# Patient Record
Sex: FEMALE | NOT HISPANIC OR LATINO | URBAN - METROPOLITAN AREA
[De-identification: names, ages, dates, MRNs, and addresses within clinical notes are randomized per-mention and may not be internally consistent; named-entity substitution may affect disease eponyms.]

---

## 2017-02-10 VITALS
HEART RATE: 89 BPM | OXYGEN SATURATION: 98 % | DIASTOLIC BLOOD PRESSURE: 70 MMHG | WEIGHT: 205.03 LBS | HEIGHT: 63 IN | TEMPERATURE: 98 F | SYSTOLIC BLOOD PRESSURE: 156 MMHG | RESPIRATION RATE: 16 BRPM

## 2017-02-13 ENCOUNTER — INPATIENT (INPATIENT)
Facility: HOSPITAL | Age: 42
LOS: 3 days | Discharge: HOME | DRG: 352 | End: 2017-02-17
Attending: SURGERY | Admitting: SURGERY
Payer: COMMERCIAL

## 2017-02-13 DIAGNOSIS — Z90.49 ACQUIRED ABSENCE OF OTHER SPECIFIED PARTS OF DIGESTIVE TRACT: Chronic | ICD-10-CM

## 2017-02-13 DIAGNOSIS — Z98.891 HISTORY OF UTERINE SCAR FROM PREVIOUS SURGERY: Chronic | ICD-10-CM

## 2017-02-13 RX ORDER — SODIUM CHLORIDE 9 MG/ML
1000 INJECTION, SOLUTION INTRAVENOUS
Qty: 0 | Refills: 0 | Status: DISCONTINUED | OUTPATIENT
Start: 2017-02-13 | End: 2017-02-14

## 2017-02-13 RX ORDER — CEFAZOLIN SODIUM 1 G
2000 VIAL (EA) INJECTION EVERY 8 HOURS
Qty: 0 | Refills: 0 | Status: DISCONTINUED | OUTPATIENT
Start: 2017-02-13 | End: 2017-02-17

## 2017-02-13 RX ORDER — HEPARIN SODIUM 5000 [USP'U]/ML
5000 INJECTION INTRAVENOUS; SUBCUTANEOUS EVERY 8 HOURS
Qty: 0 | Refills: 0 | Status: DISCONTINUED | OUTPATIENT
Start: 2017-02-14 | End: 2017-02-17

## 2017-02-13 RX ORDER — ONDANSETRON 8 MG/1
4 TABLET, FILM COATED ORAL EVERY 6 HOURS
Qty: 0 | Refills: 0 | Status: DISCONTINUED | OUTPATIENT
Start: 2017-02-13 | End: 2017-02-15

## 2017-02-13 RX ORDER — BUPIVACAINE HCL/PF 7.5 MG/ML
400 VIAL (ML) INJECTION
Qty: 0 | Refills: 0 | Status: DISCONTINUED | OUTPATIENT
Start: 2017-02-13 | End: 2017-02-16

## 2017-02-13 RX ORDER — HYDROMORPHONE HYDROCHLORIDE 2 MG/ML
30 INJECTION INTRAMUSCULAR; INTRAVENOUS; SUBCUTANEOUS
Qty: 0 | Refills: 0 | Status: DISCONTINUED | OUTPATIENT
Start: 2017-02-13 | End: 2017-02-15

## 2017-02-13 RX ORDER — NALOXONE HYDROCHLORIDE 4 MG/.1ML
0.1 SPRAY NASAL
Qty: 0 | Refills: 0 | Status: DISCONTINUED | OUTPATIENT
Start: 2017-02-13 | End: 2017-02-15

## 2017-02-13 RX ADMIN — HYDROMORPHONE HYDROCHLORIDE 30 MILLILITER(S): 2 INJECTION INTRAMUSCULAR; INTRAVENOUS; SUBCUTANEOUS at 17:49

## 2017-02-13 RX ADMIN — ONDANSETRON 4 MILLIGRAM(S): 8 TABLET, FILM COATED ORAL at 20:26

## 2017-02-13 RX ADMIN — Medication 100 MILLIGRAM(S): at 19:26

## 2017-02-13 NOTE — PROGRESS NOTE ADULT - SUBJECTIVE AND OBJECTIVE BOX
Pre Op Dx: Ventral hernia  Procedure: Cari procedure   Surgeon: Dr. Smith    S: Pt seen and examined in PACU, has no complaints at this time. Denies CP, SOB, N/V. Pain controlled with medication.    O:  T(C): 36.4, Max: 36.8 (02-13 @ 17:03)  T(F): 97.6, Max: 98.3 (02-13 @ 17:03)  HR: 73 (72 - 97)  BP: 146/91 (120/75 - 146/91)  RR: 16 (16 - 16)  SpO2: 100% (98% - 100%)  Wt(kg): --            Gen: NAD, resting comfortably in bed  Neuro: AAOx3, No focal deficits  C/V: RRR, +S1/S2  Pulm: CTAB, no respiratory distress  Abd: soft, ND, ATTP  Incision: C/D/I  Drains:    Extrem: WNL, SCDs in place      A/P: 41yFemale s/p above procedure  Diet: NPO  IVF  Pain/nausea control PRN   DVT ppx: SCDs/SQH  Zak  JOANNA x 2

## 2017-02-13 NOTE — H&P ADULT. - ASSESSMENT
41F s/p [place] repair of incisional hernia, right inguinal hernia repair, creation of myofascial flaps, scar removal and complex wound closure with insertion of On-Q pumps  - admit  - npo until bowel function  - ivf  - pain control; dPCA, on-q pumps  - nausea control  - HSQ in am  - ancef until drains come out  - semi-fowlers  - OOB tomorrow hunched over  - JOANNA's to bulb suction  - abdominal binder  - IS/SCDs

## 2017-02-13 NOTE — BRIEF OPERATIVE NOTE - OPERATION/FINDINGS
peritoneum entered. Progrip mesh placed retrorectus in lower abdomen. Complex abdominal wound closure with placement of on-q pumps and #10 JOANNA's under subq flaps. peritoneum entered. Progrip mesh placed retrorectus in lower abdomen. Complex abdominal wound closure with placement of on-q pumps and #10 JOANNA's under subq flaps. Complex wound closure in multiple layers was performed.

## 2017-02-13 NOTE — BRIEF OPERATIVE NOTE - PROCEDURE
Excision of mass  02/13/2017  left abdomen  Active  [id number]  Exploratory laparotomy  02/13/2017    Active  [id number]  Repair, hernia, inguinal, open  02/13/2017  right  Active  [id number]  Incisional hernia repair with mesh  02/13/2017    Active  [id number]  Layer closure of wound  02/13/2017  abdomen  Active  [id number]

## 2017-02-13 NOTE — BRIEF OPERATIVE NOTE - POST-OP DX
Abdominal wall mass  02/13/2017    Eliza Morales  Incisional hernia, without obstruction or gangrene  02/13/2017    Eliza Morales  Inguinal hernia, right  02/13/2017    Eliza Morales

## 2017-02-13 NOTE — H&P ADULT. - HISTORY OF PRESENT ILLNESS
This is a 41 year old female who presents for elective abdominal wall reconstruction with repair of incisional and right inguinal hernia. She has a history of 3 c-sections. She denies any obstructive symptoms, but has pain at the hernia sites as well as visible bulges and discomfort.

## 2017-02-13 NOTE — BRIEF OPERATIVE NOTE - PRE-OP DX
Abdominal wall mass  02/13/2017    Eliza Morales  Incisional hernia, without obstruction or gangrene  02/13/2017    Eliza Morales  Inguinal hernia of right side without obstruction or gangrene  02/13/2017    Eliza Morales

## 2017-02-14 LAB
ANION GAP SERPL CALC-SCNC: 4 MMOL/L — LOW (ref 9–16)
BUN SERPL-MCNC: 7 MG/DL — SIGNIFICANT CHANGE UP (ref 7–23)
CALCIUM SERPL-MCNC: 8.2 MG/DL — LOW (ref 8.5–10.5)
CHLORIDE SERPL-SCNC: 105 MMOL/L — SIGNIFICANT CHANGE UP (ref 96–108)
CO2 SERPL-SCNC: 32 MMOL/L — HIGH (ref 22–31)
CREAT SERPL-MCNC: 0.79 MG/DL — SIGNIFICANT CHANGE UP (ref 0.5–1.3)
GLUCOSE SERPL-MCNC: 94 MG/DL — SIGNIFICANT CHANGE UP (ref 70–99)
HCT VFR BLD CALC: 36.8 % — SIGNIFICANT CHANGE UP (ref 34.5–45)
HGB BLD-MCNC: 12.2 G/DL — SIGNIFICANT CHANGE UP (ref 11.5–15.5)
MAGNESIUM SERPL-MCNC: 2.2 MG/DL — SIGNIFICANT CHANGE UP (ref 1.6–2.4)
MCHC RBC-ENTMCNC: 26.2 PG — LOW (ref 27–34)
MCHC RBC-ENTMCNC: 33.2 G/DL — SIGNIFICANT CHANGE UP (ref 32–36)
MCV RBC AUTO: 79 FL — LOW (ref 80–100)
PHOSPHATE SERPL-MCNC: 2.3 MG/DL — LOW (ref 2.5–4.5)
PLATELET # BLD AUTO: 261 K/UL — SIGNIFICANT CHANGE UP (ref 150–400)
POTASSIUM SERPL-MCNC: 3.6 MMOL/L — SIGNIFICANT CHANGE UP (ref 3.5–5.3)
POTASSIUM SERPL-SCNC: 3.6 MMOL/L — SIGNIFICANT CHANGE UP (ref 3.5–5.3)
RBC # BLD: 4.66 M/UL — SIGNIFICANT CHANGE UP (ref 3.8–5.2)
RBC # FLD: 13.2 % — SIGNIFICANT CHANGE UP (ref 10.3–16.9)
SODIUM SERPL-SCNC: 141 MMOL/L — SIGNIFICANT CHANGE UP (ref 135–145)
WBC # BLD: 8.5 K/UL — SIGNIFICANT CHANGE UP (ref 3.8–10.5)
WBC # FLD AUTO: 8.5 K/UL — SIGNIFICANT CHANGE UP (ref 3.8–10.5)

## 2017-02-14 RX ORDER — SODIUM CHLORIDE 9 MG/ML
1000 INJECTION, SOLUTION INTRAVENOUS
Qty: 0 | Refills: 0 | Status: DISCONTINUED | OUTPATIENT
Start: 2017-02-14 | End: 2017-02-15

## 2017-02-14 RX ORDER — POTASSIUM PHOSPHATE, MONOBASIC POTASSIUM PHOSPHATE, DIBASIC 236; 224 MG/ML; MG/ML
15 INJECTION, SOLUTION INTRAVENOUS ONCE
Qty: 0 | Refills: 0 | Status: COMPLETED | OUTPATIENT
Start: 2017-02-14 | End: 2017-02-14

## 2017-02-14 RX ADMIN — SODIUM CHLORIDE 125 MILLILITER(S): 9 INJECTION, SOLUTION INTRAVENOUS at 02:35

## 2017-02-14 RX ADMIN — ONDANSETRON 4 MILLIGRAM(S): 8 TABLET, FILM COATED ORAL at 21:31

## 2017-02-14 RX ADMIN — HEPARIN SODIUM 5000 UNIT(S): 5000 INJECTION INTRAVENOUS; SUBCUTANEOUS at 16:43

## 2017-02-14 RX ADMIN — Medication 100 MILLIGRAM(S): at 11:57

## 2017-02-14 RX ADMIN — Medication 100 MILLIGRAM(S): at 20:27

## 2017-02-14 RX ADMIN — HEPARIN SODIUM 5000 UNIT(S): 5000 INJECTION INTRAVENOUS; SUBCUTANEOUS at 23:55

## 2017-02-14 RX ADMIN — POTASSIUM PHOSPHATE, MONOBASIC POTASSIUM PHOSPHATE, DIBASIC 63.75 MILLIMOLE(S): 236; 224 INJECTION, SOLUTION INTRAVENOUS at 16:43

## 2017-02-14 RX ADMIN — HEPARIN SODIUM 5000 UNIT(S): 5000 INJECTION INTRAVENOUS; SUBCUTANEOUS at 06:30

## 2017-02-14 RX ADMIN — Medication 100 MILLIGRAM(S): at 04:20

## 2017-02-14 NOTE — PROGRESS NOTE ADULT - SUBJECTIVE AND OBJECTIVE BOX
INTERVAL HPI/OVERNIGHT EVENTS:   SURGERY ATTENDING    STATUS POST:                           EXPLORATORY LAPAROTOMY, REPAIR OF INCISIONAL HERNIA WITH MYOFASCIAL FLAPS BILATERALLY AND REPAIR OF RIGHT INGUINAL HERNIA PREPERITONEALLY WITH PERITONEAL FLAP AND MESH, EXCISION OF ABDOMINAL WALL MASS ON THE LEFT, EXCISION OF SCAR TISSUE 10X2CM WITH EXCESSIVE SKIN AND FAT, ON Q PUMP PLACEMENT FOR PAIN CONTROL, PREVENA VAC PLACEMENT AS WOUND MANAGEMENT SYSTEM, DRAINAGE, COMPLEX ABDOMINAL WOUND CLOSURE BY PLASTICS, ABDOMINAL WALL RECONSTRUCTION    POST OPERATIVE DAY #: 1    SUBJECTIVE:  Flatus: [ ] YES [X ] NO             Bowel Movement: [ ] YES [X ] NO  Pain (0-10):     2       Pain Control Adequate: [ ] YES [ ] NO  Nausea: [ ] YES [X ] NO            Vomiting: [ ] YES [X ] NO  Diarrhea: [ ] YES [X ] NO         Constipation: [ ] YES [X ] NO     Chest Pain: [ ] YES [X ] NO    SOB:  [ ] YES [X ] NO    MEDICATIONS  (STANDING):  BUpivacaine 0.5% On-Q Pump 400milliLiter(s) IntraDermal. <Continuous>  heparin  Injectable 5000Unit(s) SubCutaneous every 8 hours  lactated ringers. 1000milliLiter(s) IV Continuous <Continuous>  HYDROmorphone PCA (1 mG/mL) 30milliLiter(s) PCA Continuous PCA Continuous  ceFAZolin   IVPB 2000milliGRAM(s) IV Intermittent every 8 hours    MEDICATIONS  (PRN):  naloxone Injectable 0.1milliGRAM(s) IV Push every 3 minutes PRN For ANY of the following changes in patient status:  A. RR LESS THAN 10 breaths per minute, B. Oxygen saturation LESS THAN 90%, C. Sedation score of 6  ondansetron Injectable 4milliGRAM(s) IV Push every 6 hours PRN Nausea      Vital Signs Last 24 Hrs  T(C): 36.2, Max: 36.8 (02-13 @ 17:03)  T(F): 97.1, Max: 98.3 (02-13 @ 17:03)  HR: 68 (67 - 97)  BP: 131/70 (120/75 - 146/91)  BP(mean): --  RR: 14 (14 - 16)  SpO2: 99% (98% - 100%)            I&O's Detail    I & Os for current day (as of 14 Feb 2017 13:49)  =============================================  IN:    Other: 2800 ml    lactated ringers.: 1125 ml    Solution: 50 ml    Total IN: 3975 ml  ---------------------------------------------  OUT:    Indwelling Catheter - Urethral: 925 ml    Estimated Blood Loss: 200 ml    Bulb: 81 ml    Bulb: 78 ml    Total OUT: 1284 ml  ---------------------------------------------  Total NET: 2691 ml      LABS:                RADIOLOGY & ADDITIONAL STUDIES:

## 2017-02-14 NOTE — PROGRESS NOTE ADULT - ASSESSMENT
41yFemale s/p above procedure    NPO  IVF  PAin/nausea control  DVT ppx  IS  AM labs 41yFemale s/p cecilia procedure for incisional hernia repair. Pt has passed flatus, will discuss advancing to CLD.    NPO  IVF  PAin/nausea control  DVT ppx  IS  AM labs

## 2017-02-14 NOTE — PROGRESS NOTE ADULT - ASSESSMENT
A/P: 40 y/o female s/p incisional hernia repair, right inguinal hernia repair with mesh, and bilateral component separation.   - Patient to be up and out of bed as much as possible walking.   - Diet / dispo as per primary team.

## 2017-02-14 NOTE — PROGRESS NOTE ADULT - ASSESSMENT
ABD SOFT, BS-, FLATUS-, BM-, PREVENA IN PLACE, JOANNA'S SEROSANGUINOUS, ON Q PUMP IN PLACE.  START ICE CHIPS AND WATER, IF TOLERATING ADVANCE TO CLEARS, DVT PROFILAXIS, OOB TO CHAIR, SPIROMETRY, F/U LABS, VS.

## 2017-02-14 NOTE — PROGRESS NOTE ADULT - SUBJECTIVE AND OBJECTIVE BOX
O/N: POC WNL. Afebrile, VSS  2/13: Underwent cecilia repair of incisional hernia.     STATUS POST: cecilia repair of incisional hernia    POST OP DAY #: 1 STATUS POST: cecilia repair of incisional hernia    POST OP DAY #: 1    STATUS POST:       SUBJECTIVE: Pt has passed flatus. Patient seen and examined bedside by chief resident.    O/N: POC WNL. Afebrile, VSS  2/13: Underwent cecilia repair of incisional hernia.       heparin  Injectable 5000Unit(s) SubCutaneous every 8 hours  ceFAZolin   IVPB 2000milliGRAM(s) IV Intermittent every 8 hours      Vital Signs Last 24 Hrs  T(C): 36.6, Max: 36.8 (02-13 @ 17:03)  T(F): 97.8, Max: 98.3 (02-13 @ 17:03)  HR: 67 (67 - 97)  BP: 132/72 (120/75 - 146/91)  BP(mean): --  RR: 14 (14 - 16)  SpO2: 99% (98% - 100%)  I&O's Detail    I & Os for current day (as of s/p cecilia repair of incision al14 Feb 2017 07:17)  =============================================  IN:    Other: 2800 ml    lactated ringers.: 1125 ml    Solution: 50 ml    Total IN: 3975 ml  ---------------------------------------------  OUT:    Indwelling Catheter - Urethral: 925 ml    Estimated Blood Loss: 200 ml    Bulb: 81 ml    Bulb: 78 ml    Total OUT: 1284 ml  ---------------------------------------------  Total NET: 2691 ml      General: NAD, resting comfortably in bed  C/V: NSR  Pulm: Nonlabored breathing, no respiratory distress  Abd: soft, NT/ND, site w/o discharge/edema/erythema  Extrem: WWP, no edema, SCDs in place        LABS:                RADIOLOGY & ADDITIONAL STUDIES:

## 2017-02-14 NOTE — PROGRESS NOTE ADULT - SUBJECTIVE AND OBJECTIVE BOX
SUBJECTIVE:  Patient seen and evaluated at bedside resting comfortably with pain well-controlled.     OBJECTIVE:     ** VITAL SIGNS / I&O's **    T(C): 36.6, Max: 36.8 (02-13 @ 17:03)  T(F): 97.8, Max: 98.3 (02-13 @ 17:03)  HR: 67 (67 - 97)  BP: 132/72 (120/75 - 146/91)  RR: 14 (14 - 16)  SpO2: 99% (98% - 100%)  Wt(kg): --      I & Os for current day (as of 14 Feb 2017 06:36)  =============================================  IN:    Other: 2800 ml    lactated ringers.: 1125 ml    Solution: 50 ml    Total IN: 3975 ml  ---------------------------------------------  OUT:    Indwelling Catheter - Urethral: 925 ml    Estimated Blood Loss: 200 ml    Bulb: 81 ml    Bulb: 78 ml    Total OUT: 1284 ml  ---------------------------------------------  Total NET: 2691 ml      ** PHYSICAL EXAM **    -- CONSTITUTIONAL: AOx3. NAD.   -- HEENT: NCAT  -- CARDIOVASCULAR: Regular rate and rhythm.  -- RESPIRATORY: Bilateral breath sounds. Nonlabored breathing, no respiratory distress.   -- ABDOMEN: Prevena vac in place; dressings intact without signs of leak or obstruction in tubing.    -- EXTREMITIES: Compression dressings were present in both lower extremities at the time of visit; B/L HINAE PENNYP

## 2017-02-15 LAB
ANION GAP SERPL CALC-SCNC: 6 MMOL/L — LOW (ref 9–16)
BUN SERPL-MCNC: 6 MG/DL — LOW (ref 7–23)
CALCIUM SERPL-MCNC: 8.4 MG/DL — LOW (ref 8.5–10.5)
CHLORIDE SERPL-SCNC: 105 MMOL/L — SIGNIFICANT CHANGE UP (ref 96–108)
CO2 SERPL-SCNC: 27 MMOL/L — SIGNIFICANT CHANGE UP (ref 22–31)
CREAT SERPL-MCNC: 0.53 MG/DL — SIGNIFICANT CHANGE UP (ref 0.5–1.3)
GLUCOSE SERPL-MCNC: 95 MG/DL — SIGNIFICANT CHANGE UP (ref 70–99)
HCT VFR BLD CALC: 35.5 % — SIGNIFICANT CHANGE UP (ref 34.5–45)
HGB BLD-MCNC: 11.5 G/DL — SIGNIFICANT CHANGE UP (ref 11.5–15.5)
MAGNESIUM SERPL-MCNC: 2.2 MG/DL — SIGNIFICANT CHANGE UP (ref 1.6–2.4)
MCHC RBC-ENTMCNC: 26 PG — LOW (ref 27–34)
MCHC RBC-ENTMCNC: 32.4 G/DL — SIGNIFICANT CHANGE UP (ref 32–36)
MCV RBC AUTO: 80.1 FL — SIGNIFICANT CHANGE UP (ref 80–100)
PHOSPHATE SERPL-MCNC: 2.6 MG/DL — SIGNIFICANT CHANGE UP (ref 2.5–4.5)
PLATELET # BLD AUTO: 243 K/UL — SIGNIFICANT CHANGE UP (ref 150–400)
POTASSIUM SERPL-MCNC: 3.9 MMOL/L — SIGNIFICANT CHANGE UP (ref 3.5–5.3)
POTASSIUM SERPL-SCNC: 3.9 MMOL/L — SIGNIFICANT CHANGE UP (ref 3.5–5.3)
RBC # BLD: 4.43 M/UL — SIGNIFICANT CHANGE UP (ref 3.8–5.2)
RBC # FLD: 13.6 % — SIGNIFICANT CHANGE UP (ref 10.3–16.9)
SODIUM SERPL-SCNC: 138 MMOL/L — SIGNIFICANT CHANGE UP (ref 135–145)
WBC # BLD: 9.3 K/UL — SIGNIFICANT CHANGE UP (ref 3.8–10.5)
WBC # FLD AUTO: 9.3 K/UL — SIGNIFICANT CHANGE UP (ref 3.8–10.5)

## 2017-02-15 RX ORDER — ACETAMINOPHEN 500 MG
975 TABLET ORAL EVERY 8 HOURS
Qty: 0 | Refills: 0 | Status: DISCONTINUED | OUTPATIENT
Start: 2017-02-15 | End: 2017-02-16

## 2017-02-15 RX ORDER — HYDROMORPHONE HYDROCHLORIDE 2 MG/ML
0.5 INJECTION INTRAMUSCULAR; INTRAVENOUS; SUBCUTANEOUS EVERY 4 HOURS
Qty: 0 | Refills: 0 | Status: DISCONTINUED | OUTPATIENT
Start: 2017-02-15 | End: 2017-02-16

## 2017-02-15 RX ORDER — SODIUM CHLORIDE 9 MG/ML
1000 INJECTION, SOLUTION INTRAVENOUS
Qty: 0 | Refills: 0 | Status: DISCONTINUED | OUTPATIENT
Start: 2017-02-15 | End: 2017-02-17

## 2017-02-15 RX ORDER — POTASSIUM PHOSPHATE, MONOBASIC POTASSIUM PHOSPHATE, DIBASIC 236; 224 MG/ML; MG/ML
15 INJECTION, SOLUTION INTRAVENOUS ONCE
Qty: 0 | Refills: 0 | Status: COMPLETED | OUTPATIENT
Start: 2017-02-15 | End: 2017-02-15

## 2017-02-15 RX ADMIN — Medication 975 MILLIGRAM(S): at 14:37

## 2017-02-15 RX ADMIN — HYDROMORPHONE HYDROCHLORIDE 0.5 MILLIGRAM(S): 2 INJECTION INTRAMUSCULAR; INTRAVENOUS; SUBCUTANEOUS at 21:57

## 2017-02-15 RX ADMIN — HYDROMORPHONE HYDROCHLORIDE 0.5 MILLIGRAM(S): 2 INJECTION INTRAMUSCULAR; INTRAVENOUS; SUBCUTANEOUS at 22:12

## 2017-02-15 RX ADMIN — HEPARIN SODIUM 5000 UNIT(S): 5000 INJECTION INTRAVENOUS; SUBCUTANEOUS at 17:47

## 2017-02-15 RX ADMIN — Medication 975 MILLIGRAM(S): at 15:37

## 2017-02-15 RX ADMIN — Medication 975 MILLIGRAM(S): at 22:56

## 2017-02-15 RX ADMIN — Medication 100 MILLIGRAM(S): at 20:41

## 2017-02-15 RX ADMIN — HEPARIN SODIUM 5000 UNIT(S): 5000 INJECTION INTRAVENOUS; SUBCUTANEOUS at 07:14

## 2017-02-15 RX ADMIN — Medication 975 MILLIGRAM(S): at 21:54

## 2017-02-15 RX ADMIN — Medication 100 MILLIGRAM(S): at 13:39

## 2017-02-15 RX ADMIN — SODIUM CHLORIDE 30 MILLILITER(S): 9 INJECTION, SOLUTION INTRAVENOUS at 07:12

## 2017-02-15 RX ADMIN — SODIUM CHLORIDE 130 MILLILITER(S): 9 INJECTION, SOLUTION INTRAVENOUS at 03:54

## 2017-02-15 RX ADMIN — POTASSIUM PHOSPHATE, MONOBASIC POTASSIUM PHOSPHATE, DIBASIC 63.75 MILLIMOLE(S): 236; 224 INJECTION, SOLUTION INTRAVENOUS at 08:59

## 2017-02-15 RX ADMIN — Medication 100 MILLIGRAM(S): at 03:54

## 2017-02-15 NOTE — PROGRESS NOTE ADULT - ASSESSMENT
A/P: 40 y/o female s/p elective abdominal wall reconstruction with repair of incisional and right inguinal hernia.   - Recommend discontinuing the PCA and switching to oral pain management.   - Patient to be up and out of bed.   - Continue abdominal binder  - Prior to discharge; switch to home Prevena vac

## 2017-02-15 NOTE — PROGRESS NOTE ADULT - ASSESSMENT
41yFemale s/p above procedure    CLD  Pain/Nausea control  DVT ppx  AM labs  Bed in semi-fowlers   Ancef  JPs x 2 41yFemale s/p above procedure    Advance to full liquid diet  Pain/Nausea control  DVT ppx  AM labs  Bed in semi-fowlers   Ancef  JPs x 2

## 2017-02-15 NOTE — PROGRESS NOTE ADULT - SUBJECTIVE AND OBJECTIVE BOX
INTERVAL HPI/OVERNIGHT EVENTS:   SURGERY ATTENDING    STATUS POST:                            EXPLORATORY LAPAROTOMY, REPAIR OF INCISIONAL HERNIA WITH MYOFASCIAL FLAPS BILATERALLY AND REPAIR OF RIGHT INGUINAL HERNIA PREPERITONEALLY WITH PERITONEAL FLAP AND MESH, EXCISION OF ABDOMINAL WALL MASS ON THE LEFT, EXCISION OF SCAR TISSUE 10X2CM WITH EXCESSIVE SKIN AND FAT, ON Q PUMP PLACEMENT FOR PAIN CONTROL, PREVENA VAC PLACEMENT AS WOUND MANAGEMENT SYSTEM, DRAINAGE, COMPLEX ABDOMINAL WOUND CLOSURE BY PLASTICS, ABDOMINAL WALL RECONSTRUCTION      POST OPERATIVE DAY #: 2    SUBJECTIVE:  Flatus: [X ] YES [ ] NO             Bowel Movement: [ ] YES [X ] NO  Pain (0-10):      3      Pain Control Adequate: [X ] YES [ ] NO  Nausea: [ ] YES [X ] NO            Vomiting: [ ] YES [X ] NO  Diarrhea: [ ] YES [X ] NO         Constipation: [ ] YES [X ] NO     Chest Pain: [ ] YES [X ] NO    SOB:  [ ] YES [X ] NO    MEDICATIONS  (STANDING):  BUpivacaine 0.5% On-Q Pump 400milliLiter(s) IntraDermal. <Continuous>  heparin  Injectable 5000Unit(s) SubCutaneous every 8 hours  HYDROmorphone PCA (1 mG/mL) 30milliLiter(s) PCA Continuous PCA Continuous  ceFAZolin   IVPB 2000milliGRAM(s) IV Intermittent every 8 hours  acetaminophen   Tablet. 975milliGRAM(s) Oral every 8 hours  lactated ringers. 1000milliLiter(s) IV Continuous <Continuous>    MEDICATIONS  (PRN):  naloxone Injectable 0.1milliGRAM(s) IV Push every 3 minutes PRN For ANY of the following changes in patient status:  A. RR LESS THAN 10 breaths per minute, B. Oxygen saturation LESS THAN 90%, C. Sedation score of 6  ondansetron Injectable 4milliGRAM(s) IV Push every 6 hours PRN Nausea      Vital Signs Last 24 Hrs  T(C): 37.4, Max: 37.4 (02-15 @ 16:22)  T(F): 99.3, Max: 99.3 (02-15 @ 16:22)  HR: 88 (83 - 91)  BP: 126/83 (117/79 - 133/82)  BP(mean): --  RR: 16 (16 - 17)  SpO2: 97% (97% - 99%)            I&O's Detail  I & Os for 24h ending 15 Feb 2017 07:00  =============================================  IN:    lactated ringers: 1300 ml    Solution: 100 ml    Total IN: 1400 ml  ---------------------------------------------  OUT:    Indwelling Catheter - Urethral: 4350 ml    Bulb: 130 ml    Bulb: 70 ml    Total OUT: 4550 ml  ---------------------------------------------  Total NET: -3150 ml    I & Os for current day (as of 15 Feb 2017 19:40)  =============================================  IN:    Total IN: 0 ml  ---------------------------------------------  OUT:    Indwelling Catheter - Urethral: 2800 ml    Voided: 750 ml    Bulb: 105 ml    Bulb: 55 ml    Total OUT: 3710 ml  ---------------------------------------------  Total NET: -3710 ml      LABS:                        11.5   9.3   )-----------( 243      ( 15 Feb 2017 07:27 )             35.5     15 Feb 2017 07:27    138    |  105    |  6      ----------------------------<  95     3.9     |  27     |  0.53     Ca    8.4        15 Feb 2017 07:27  Phos  2.6       15 Feb 2017 07:27  Mg     2.2       15 Feb 2017 07:27            RADIOLOGY & ADDITIONAL STUDIES:

## 2017-02-15 NOTE — PROGRESS NOTE ADULT - SUBJECTIVE AND OBJECTIVE BOX
POD #2. S/p abdominal wall reconstruction.   Patient is comfortable, JPs with s-s fluid. Provena and on Q pump in place. Pain is controlled.   Plan   d/c home tomorrow on PO Abx.   F/u with Dr. Rojas next Tuesday, Dr. Schneider 2/18/17.

## 2017-02-15 NOTE — PROGRESS NOTE ADULT - SUBJECTIVE AND OBJECTIVE BOX
O/N: Afebrile, VSS. +Nausea no emesis.  2/14: passing flatus, advaced to CLD. VSS. HR 60s.    STATUS POST: cecilia procedure    POST OP DAY #: 2 O/N: Afebrile, VSS. +Nausea no emesis.  2/14: passing flatus, advaced to CLD. VSS. HR 60s.    STATUS POST: cecilia procedure    POST OP DAY #: 2    SUBJECTIVE:  Pain controlled. Denies CP/SOB/N/V/palpitations  Flatus: [X ] YES [ ] NO             Bowel Movement: [ ] YES [ X] NO      MEDICATIONS  (STANDING):  BUpivacaine 0.5% On-Q Pump 400milliLiter(s) IntraDermal. <Continuous>  heparin  Injectable 5000Unit(s) SubCutaneous every 8 hours  HYDROmorphone PCA (1 mG/mL) 30milliLiter(s) PCA Continuous PCA Continuous  ceFAZolin   IVPB 2000milliGRAM(s) IV Intermittent every 8 hours  acetaminophen   Tablet. 975milliGRAM(s) Oral every 8 hours    MEDICATIONS  (PRN):  naloxone Injectable 0.1milliGRAM(s) IV Push every 3 minutes PRN For ANY of the following changes in patient status:  A. RR LESS THAN 10 breaths per minute, B. Oxygen saturation LESS THAN 90%, C. Sedation score of 6  ondansetron Injectable 4milliGRAM(s) IV Push every 6 hours PRN Nausea      Vital Signs Last 24 Hrs  T(C): 36.9, Max: 36.9 (02-15 @ 04:00)  T(F): 98.4, Max: 98.4 (02-15 @ 04:00)  HR: 85 (68 - 85)  BP: 117/79 (117/79 - 133/82)  BP(mean): --  RR: 16 (14 - 16)  SpO2: 99% (98% - 99%)    Physical Exam:    General: A&Ox3, NAD.   CV: RRR  Pulm: nonlabored breathing  Abd: soft, NTND, no guarding, no rebound. Incision: CDI. JPs SS x 2.   : becerril draining clear urine  Ext: No edema. WWP.       I&O's Detail    I & Os for current day (as of 15 Feb 2017 07:04)  =============================================  IN:    lactated ringers: 1300 ml    Solution: 100 ml    Total IN: 1400 ml  ---------------------------------------------  OUT:    Indwelling Catheter - Urethral: 4350 ml    Bulb: 130 ml    Bulb: 70 ml    Total OUT: 4550 ml  ---------------------------------------------  Total NET: -3150 ml      LABS:                        12.2   8.5   )-----------( 261      ( 14 Feb 2017 15:23 )             36.8     14 Feb 2017 15:23    141    |  105    |  7      ----------------------------<  94     3.6     |  32     |  0.79     Ca    8.2        14 Feb 2017 15:23  Phos  2.3       14 Feb 2017 15:23  Mg     2.2       14 Feb 2017 15:23            RADIOLOGY & ADDITIONAL STUDIES:

## 2017-02-15 NOTE — PROGRESS NOTE ADULT - SUBJECTIVE AND OBJECTIVE BOX
SUBJECTIVE:  Patient seen and evaluated at bedside resting comfortably with pain well controlled. Patient notes that she is not using the PCA regularly because it makes her "nauseous".      OBJECTIVE:     ** VITAL SIGNS / I&O's **    T(C): 36.9, Max: 36.9 (02-15 @ 04:00)  T(F): 98.4, Max: 98.4 (02-15 @ 04:00)  HR: 85 (68 - 85)  BP: 117/79 (117/79 - 133/82)  RR: 16 (14 - 16)  SpO2: 99% (98% - 99%)  Wt(kg): --      I & Os for current day (as of 15 Feb 2017 07:10)  =============================================  IN:    lactated ringers: 1300 ml    Solution: 100 ml    Total IN: 1400 ml  ---------------------------------------------  OUT:    Indwelling Catheter - Urethral: 4350 ml    Bulb: 130 ml    Bulb: 70 ml    Total OUT: 4550 ml  ---------------------------------------------  Total NET: -3150 ml      ** PHYSICAL EXAM **    -- CONSTITUTIONAL: AOx3. NAD.   -- HEENT: NCAT  -- CARDIOVASCULAR: Regular rate and rhythm. S1, S2.  -- RESPIRATORY: Bilateral breath sounds. Nonlabored breathing, no respiratory distress.   -- ABDOMEN: Prevena vac in place without any signs of leak or obstruction in tubing.  Abdomen tender, nondistended.     ** LABS **                          12.2   8.5   )-----------( 261      ( 14 Feb 2017 15:23 )             36.8     14 Feb 2017 15:23    141    |  105    |  7      ----------------------------<  94     3.6     |  32     |  0.79     Ca    8.2        14 Feb 2017 15:23  Phos  2.3       14 Feb 2017 15:23  Mg     2.2       14 Feb 2017 15:23        CAPILLARY BLOOD GLUCOSE

## 2017-02-16 VITALS
HEART RATE: 94 BPM | TEMPERATURE: 98 F | OXYGEN SATURATION: 95 % | SYSTOLIC BLOOD PRESSURE: 121 MMHG | RESPIRATION RATE: 16 BRPM | DIASTOLIC BLOOD PRESSURE: 76 MMHG

## 2017-02-16 LAB
ANION GAP SERPL CALC-SCNC: 9 MMOL/L — SIGNIFICANT CHANGE UP (ref 9–16)
BUN SERPL-MCNC: 7 MG/DL — SIGNIFICANT CHANGE UP (ref 7–23)
CALCIUM SERPL-MCNC: 8.4 MG/DL — LOW (ref 8.5–10.5)
CHLORIDE SERPL-SCNC: 103 MMOL/L — SIGNIFICANT CHANGE UP (ref 96–108)
CO2 SERPL-SCNC: 26 MMOL/L — SIGNIFICANT CHANGE UP (ref 22–31)
CREAT SERPL-MCNC: 0.63 MG/DL — SIGNIFICANT CHANGE UP (ref 0.5–1.3)
GLUCOSE SERPL-MCNC: 100 MG/DL — HIGH (ref 70–99)
HCT VFR BLD CALC: 33.2 % — LOW (ref 34.5–45)
HGB BLD-MCNC: 10.8 G/DL — LOW (ref 11.5–15.5)
MAGNESIUM SERPL-MCNC: 2.4 MG/DL — SIGNIFICANT CHANGE UP (ref 1.6–2.4)
MCHC RBC-ENTMCNC: 26.2 PG — LOW (ref 27–34)
MCHC RBC-ENTMCNC: 32.5 G/DL — SIGNIFICANT CHANGE UP (ref 32–36)
MCV RBC AUTO: 80.4 FL — SIGNIFICANT CHANGE UP (ref 80–100)
PHOSPHATE SERPL-MCNC: 2.9 MG/DL — SIGNIFICANT CHANGE UP (ref 2.5–4.5)
PLATELET # BLD AUTO: 256 K/UL — SIGNIFICANT CHANGE UP (ref 150–400)
POTASSIUM SERPL-MCNC: 3.8 MMOL/L — SIGNIFICANT CHANGE UP (ref 3.5–5.3)
POTASSIUM SERPL-SCNC: 3.8 MMOL/L — SIGNIFICANT CHANGE UP (ref 3.5–5.3)
RBC # BLD: 4.13 M/UL — SIGNIFICANT CHANGE UP (ref 3.8–5.2)
RBC # FLD: 13.5 % — SIGNIFICANT CHANGE UP (ref 10.3–16.9)
SODIUM SERPL-SCNC: 138 MMOL/L — SIGNIFICANT CHANGE UP (ref 135–145)
SURGICAL PATHOLOGY STUDY: SIGNIFICANT CHANGE UP
WBC # BLD: 8.8 K/UL — SIGNIFICANT CHANGE UP (ref 3.8–10.5)
WBC # FLD AUTO: 8.8 K/UL — SIGNIFICANT CHANGE UP (ref 3.8–10.5)

## 2017-02-16 PROCEDURE — 86901 BLOOD TYPING SEROLOGIC RH(D): CPT

## 2017-02-16 PROCEDURE — 86900 BLOOD TYPING SEROLOGIC ABO: CPT

## 2017-02-16 PROCEDURE — 88304 TISSUE EXAM BY PATHOLOGIST: CPT

## 2017-02-16 PROCEDURE — 84100 ASSAY OF PHOSPHORUS: CPT

## 2017-02-16 PROCEDURE — 36415 COLL VENOUS BLD VENIPUNCTURE: CPT

## 2017-02-16 PROCEDURE — 83735 ASSAY OF MAGNESIUM: CPT

## 2017-02-16 PROCEDURE — C1781: CPT

## 2017-02-16 PROCEDURE — 86850 RBC ANTIBODY SCREEN: CPT

## 2017-02-16 PROCEDURE — 80048 BASIC METABOLIC PNL TOTAL CA: CPT

## 2017-02-16 PROCEDURE — 85027 COMPLETE CBC AUTOMATED: CPT

## 2017-02-16 RX ORDER — BUPIVACAINE HCL/PF 7.5 MG/ML
400 VIAL (ML) INJECTION
Qty: 0 | Refills: 0 | Status: DISCONTINUED | OUTPATIENT
Start: 2017-02-16 | End: 2017-02-17

## 2017-02-16 RX ORDER — CEPHALEXIN 500 MG
1 CAPSULE ORAL
Qty: 40 | Refills: 0 | OUTPATIENT
Start: 2017-02-16 | End: 2017-02-26

## 2017-02-16 RX ORDER — ACETAMINOPHEN 500 MG
3 TABLET ORAL
Qty: 0 | Refills: 0 | COMMUNITY
Start: 2017-02-16

## 2017-02-16 RX ADMIN — Medication 975 MILLIGRAM(S): at 07:36

## 2017-02-16 RX ADMIN — HEPARIN SODIUM 5000 UNIT(S): 5000 INJECTION INTRAVENOUS; SUBCUTANEOUS at 00:37

## 2017-02-16 RX ADMIN — HYDROMORPHONE HYDROCHLORIDE 0.5 MILLIGRAM(S): 2 INJECTION INTRAMUSCULAR; INTRAVENOUS; SUBCUTANEOUS at 06:36

## 2017-02-16 RX ADMIN — Medication 975 MILLIGRAM(S): at 14:32

## 2017-02-16 RX ADMIN — Medication 975 MILLIGRAM(S): at 06:36

## 2017-02-16 RX ADMIN — HEPARIN SODIUM 5000 UNIT(S): 5000 INJECTION INTRAVENOUS; SUBCUTANEOUS at 07:48

## 2017-02-16 RX ADMIN — Medication 100 MILLIGRAM(S): at 12:45

## 2017-02-16 RX ADMIN — Medication 975 MILLIGRAM(S): at 13:32

## 2017-02-16 RX ADMIN — Medication 100 MILLIGRAM(S): at 04:04

## 2017-02-16 RX ADMIN — HYDROMORPHONE HYDROCHLORIDE 0.5 MILLIGRAM(S): 2 INJECTION INTRAMUSCULAR; INTRAVENOUS; SUBCUTANEOUS at 06:51

## 2017-02-16 NOTE — DISCHARGE NOTE ADULT - CARE PLAN
Principal Discharge DX:	Umbilical hernia  Goal:	Full recovery  Instructions for follow-up, activity and diet:	-Continue a regular diet  -You may shower but no soaking baths, no swimming pools  -Activity: no heavy lifting or abdominal exercises for one month.  -Drink plenty of water - at least 2 liters/day   -You should take Tylenol 1000mg every 8 hours. Take ibuprofen between doses of tylenol, if needed. If your pain is not controlled with tylenol and ibuprofen then you may take Vicoprofen. *Note: Vicoprofen is a narcotic pain medication and can be habit forming when not taken as prescribed. Do not take this medication unless you need to and please discard any leftover medication.   -Apply ice packs to incision sites at all times.  -JOANNA drain care: Empty drain daily, or as needed, and keep a record of the amount of drain output. Strip drain tubing daily to ensure proper functioning. Keep bulb to suction at all times.   -Notify physician if you experience fever greater than 101F, worsening abdominal pain, persistent nausea or vomiting, bleeding or drainage from incision sites.  -Follow-up with Dr. Rojas in 1 week. Call the office to make an appointment. Principal Discharge DX:	Umbilical hernia  Goal:	Full recovery  Instructions for follow-up, activity and diet:	-Continue a regular diet  -You may take sponge baths but no showers or soaking baths  -Activity: no heavy lifting or abdominal exercises for one month.  -Drink plenty of water - at least 2 liters/day   -You should take Tylenol 1000mg every 8 hours. Take ibuprofen between doses of tylenol, if needed. If your pain is not controlled with tylenol and ibuprofen then you may take Vicoprofen. *Note: Vicoprofen is a narcotic pain medication and can be habit forming when not taken as prescribed. Do not take this medication unless you need to and please discard any leftover medication.   -Apply ice packs to incision sites at all times.  -JOANNA drain care: Empty drain daily, or as needed, and keep a record of the amount of drain output. Bring record to your f/u appointment. Strip drain tubing daily to ensure proper functioning. Keep bulb to suction at all times.   -Notify physician if you experience fever greater than 101F, worsening abdominal pain, persistent nausea or vomiting, bleeding or drainage from incision sites.  -Follow-up with Dr. Rojas in 1 week. Call the office to make an appointment.

## 2017-02-16 NOTE — DISCHARGE NOTE ADULT - MEDICATION SUMMARY - MEDICATIONS TO TAKE
I will START or STAY ON the medications listed below when I get home from the hospital:    acetaminophen 325 mg oral tablet  -- 3 tab(s) by mouth every 8 hours  -- Indication: For Pain control    Vicoprofen 7.5 mg-200 mg oral tablet  -- 1 tab(s) by mouth every 4 hours -for severe pain MDD:6tabs  -- Caution federal law prohibits the transfer of this drug to any person other  than the person for whom it was prescribed.  Do not drink alcoholic beverages when taking this medication.  Do not take aspirin or aspirin containing products without knowledge and consent of your physician.  May cause drowsiness.  Alcohol may intensify this effect.  Use care when operating dangerous machinery.  Take with food or milk.  This drug may impair the ability to drive or operate machinery.  Use care until you become familiar with its effects.  Using more of this medication than prescribed may cause serious breathing problems.    -- Indication: For Pain control    cephalexin 500 mg oral capsule  -- 1 cap(s) by mouth 4 times a day  -- Finish all this medication unless otherwise directed by prescriber.    -- Indication: For Prophylaxis

## 2017-02-16 NOTE — DIETITIAN INITIAL EVALUATION ADULT. - OTHER INFO
Pt s/p Rive's procedure. Pt tolerating Full Liquid Diet with fair PO intake. Pt denies any n/v, yet reports -BM at this time. Encouraged fluid/fiber upon diet advancement. Pt denies any significant wt changes.  Pain controlled at this time. NKFA.

## 2017-02-16 NOTE — DISCHARGE NOTE ADULT - PATIENT PORTAL LINK FT
“You can access the [facility] Patient Portal, offered by [facility], by registering with the following website: [email or web address]”

## 2017-02-16 NOTE — PROGRESS NOTE ADULT - ASSESSMENT
ABD SOFT, BS+, BM+, FLATUS +, PREVENA VAC IN PLACE, WORKING WELL, ON Q PUMP WORKING WELL, PAIN CONTROLLED.  D/C HOME WITH F/U IN THE OFFICE.

## 2017-02-16 NOTE — PATIENT PROFILE ADULT. - VISION (WITH CORRECTIVE LENSES IF THE PATIENT USUALLY WEARS THEM):
Normal vision: sees adequately in most situations; can see medication labels, newsprint
Normal vision: sees adequately in most situations; can see medication labels, newsprint/glasses

## 2017-02-16 NOTE — PROGRESS NOTE ADULT - ASSESSMENT
41yFemale s/p above procedure    full liquid diet  Pain/Nausea control  DVT ppx  AM labs  Bed in semi-fowlers   Ancef  JPs x 2

## 2017-02-16 NOTE — DISCHARGE NOTE ADULT - PLAN OF CARE
Full recovery -Continue a regular diet  -You may shower but no soaking baths, no swimming pools  -Activity: no heavy lifting or abdominal exercises for one month.  -Drink plenty of water - at least 2 liters/day   -You should take Tylenol 1000mg every 8 hours. Take ibuprofen between doses of tylenol, if needed. If your pain is not controlled with tylenol and ibuprofen then you may take Vicoprofen. *Note: Vicoprofen is a narcotic pain medication and can be habit forming when not taken as prescribed. Do not take this medication unless you need to and please discard any leftover medication.   -Apply ice packs to incision sites at all times.  -JOANNA drain care: Empty drain daily, or as needed, and keep a record of the amount of drain output. Strip drain tubing daily to ensure proper functioning. Keep bulb to suction at all times.   -Notify physician if you experience fever greater than 101F, worsening abdominal pain, persistent nausea or vomiting, bleeding or drainage from incision sites.  -Follow-up with Dr. Rojas in 1 week. Call the office to make an appointment. -Continue a regular diet  -You may take sponge baths but no showers or soaking baths  -Activity: no heavy lifting or abdominal exercises for one month.  -Drink plenty of water - at least 2 liters/day   -You should take Tylenol 1000mg every 8 hours. Take ibuprofen between doses of tylenol, if needed. If your pain is not controlled with tylenol and ibuprofen then you may take Vicoprofen. *Note: Vicoprofen is a narcotic pain medication and can be habit forming when not taken as prescribed. Do not take this medication unless you need to and please discard any leftover medication.   -Apply ice packs to incision sites at all times.  -JOANNA drain care: Empty drain daily, or as needed, and keep a record of the amount of drain output. Bring record to your f/u appointment. Strip drain tubing daily to ensure proper functioning. Keep bulb to suction at all times.   -Notify physician if you experience fever greater than 101F, worsening abdominal pain, persistent nausea or vomiting, bleeding or drainage from incision sites.  -Follow-up with Dr. Rojas in 1 week. Call the office to make an appointment.

## 2017-02-16 NOTE — PROGRESS NOTE ADULT - SUBJECTIVE AND OBJECTIVE BOX
O/N: tolerating full liquid diet   2/15: Advanced to full liquids. Crespo removed, passed TOV. O/N: tolerating full liquid diet   2/15: Advanced to full liquids. Crespo removed, passed TOV.     STATUS POST:  [place] procedure    POST OPERATIVE DAY #: 3    SUBJECTIVE:   Pain controlled. Denies CP/SOB/N/V/palpitations  Flatus: [X ] YES [ ] NO             Bowel Movement: [ ] YES [ X] NO      MEDICATIONS  (STANDING):  heparin  Injectable 5000Unit(s) SubCutaneous every 8 hours  ceFAZolin   IVPB 2000milliGRAM(s) IV Intermittent every 8 hours  acetaminophen   Tablet. 975milliGRAM(s) Oral every 8 hours  lactated ringers. 1000milliLiter(s) IV Continuous <Continuous>  BUpivacaine 0.5% On-Q Pump 400milliLiter(s) IntraDermal. <Continuous>    MEDICATIONS  (PRN):  HYDROmorphone  Injectable 0.5milliGRAM(s) IV Push every 4 hours PRN Severe Pain (7 - 10)      Vital Signs Last 24 Hrs  T(C): 36.7, Max: 37.4 (02-15 @ 16:22)  T(F): 98.1, Max: 99.3 (02-15 @ 16:22)  HR: 79 (79 - 91)  BP: 124/79 (119/69 - 126/83)  BP(mean): --  RR: 16 (16 - 17)  SpO2: 95% (95% - 98%)    Physical Exam:    General: A&Ox3, NAD.   CV: RRR  Pulm: nonlabored breathing  Abd: soft, ND, ATTP,  no guarding, no rebound. Incision: CDI. JPs SS x 2. Wound vac functioning.  Ext: No edema. WWP.       I&O's Detail    I & Os for current day (as of 16 Feb 2017 07:39)  =============================================  IN:    lactated ringers.: 180 ml    Solution: 100 ml    Total IN: 280 ml  ---------------------------------------------  OUT:    Indwelling Catheter - Urethral: 2800 ml    Voided: 1150 ml    Bulb: 145 ml    Bulb: 85 ml    Total OUT: 4180 ml  ---------------------------------------------  Total NET: -3900 ml      LABS:                        11.5   9.3   )-----------( 243      ( 15 Feb 2017 07:27 )             35.5     15 Feb 2017 07:27    138    |  105    |  6      ----------------------------<  95     3.9     |  27     |  0.53     Ca    8.4        15 Feb 2017 07:27  Phos  2.6       15 Feb 2017 07:27  Mg     2.2       15 Feb 2017 07:27            RADIOLOGY & ADDITIONAL STUDIES:

## 2017-02-16 NOTE — DISCHARGE NOTE ADULT - HOSPITAL COURSE
41yoF with incisional hernia admitted s/p [place] procedure. Procedure was well-tolerated and post-op course was uneventful. Upon return of bowel function the pt's diet was advanced as tolerated. At time of discharge the patient was stable, improved, and pain was controlled. She was discharged home with antibiotics, Q-pump, provena wound vac, and instructions for follow-up.

## 2017-02-16 NOTE — DIETITIAN INITIAL EVALUATION ADULT. - ENERGY NEEDS
Height: 63" Weight: 205lbs, IBW 115lbs+/-10%, %%, BMI - 36.3  IBW used to calculate energy needs due to pt's current body weight exceeding 120% of IBW   Nutrient needs based on [facility] standards of care for maintenance in adults.

## 2017-02-16 NOTE — PROGRESS NOTE ADULT - SUBJECTIVE AND OBJECTIVE BOX
INTERVAL HPI/OVERNIGHT EVENTS:   SURGERY ATTENDING    STATUS POST:                               EXPLORATORY LAPAROTOMY, REPAIR OF INCISIONAL HERNIA WITH MYOFASCIAL FLAPS BILATERALLY AND REPAIR OF RIGHT INGUINAL HERNIA PREPERITONEALLY WITH PERITONEAL FLAP AND MESH, EXCISION OF ABDOMINAL WALL MASS ON THE LEFT, EXCISION OF SCAR TISSUE 10X2CM WITH EXCESSIVE SKIN AND FAT, ON Q PUMP PLACEMENT FOR PAIN CONTROL, PREVENA VAC PLACEMENT AS WOUND MANAGEMENT SYSTEM, DRAINAGE, COMPLEX ABDOMINAL WOUND CLOSURE BY PLASTICS, ABDOMINAL WALL RECONSTRUCTION      POST OPERATIVE DAY #: 3    SUBJECTIVE:  Flatus: [X ] YES [ ] NO             Bowel Movement: [X ] YES [ ] NO  Pain (0-10):      2      Pain Control Adequate: [X ] YES [ ] NO  Nausea: [ ] YES [X ] NO            Vomiting: [ ] YES [X ] NO  Diarrhea: [ ] YES [X ] NO         Constipation: [ ] YES [X ] NO     Chest Pain: [ ] YES [X ] NO    SOB:  [ ] YES [X ] NO    MEDICATIONS  (STANDING):  heparin  Injectable 5000Unit(s) SubCutaneous every 8 hours  ceFAZolin   IVPB 2000milliGRAM(s) IV Intermittent every 8 hours  acetaminophen   Tablet. 975milliGRAM(s) Oral every 8 hours  lactated ringers. 1000milliLiter(s) IV Continuous <Continuous>  BUpivacaine 0.5% On-Q Pump 400milliLiter(s) IntraDermal. <Continuous>    MEDICATIONS  (PRN):  HYDROmorphone  Injectable 0.5milliGRAM(s) IV Push every 4 hours PRN Severe Pain (7 - 10)      Vital Signs Last 24 Hrs  T(C): 36.8, Max: 37.4 (02-15 @ 16:22)  T(F): 98.2, Max: 99.3 (02-15 @ 16:22)  HR: 93 (79 - 93)  BP: 117/74 (117/74 - 126/83)  BP(mean): --  RR: 17 (16 - 17)  SpO2: 94% (94% - 97%)            I&O's Detail    I & Os for current day (as of 16 Feb 2017 15:19)  =============================================  IN:    lactated ringers.: 180 ml    Solution: 100 ml    Total IN: 280 ml  ---------------------------------------------  OUT:    Indwelling Catheter - Urethral: 2800 ml    Voided: 1150 ml    Bulb: 145 ml    Bulb: 85 ml    Total OUT: 4180 ml  ---------------------------------------------  Total NET: -3900 ml      LABS:                        10.8   8.8   )-----------( 256      ( 16 Feb 2017 07:57 )             33.2     16 Feb 2017 07:57    138    |  103    |  7      ----------------------------<  100    3.8     |  26     |  0.63     Ca    8.4        16 Feb 2017 07:57  Phos  2.9       16 Feb 2017 07:57  Mg     2.4       16 Feb 2017 07:57            RADIOLOGY & ADDITIONAL STUDIES:

## 2017-02-16 NOTE — PROGRESS NOTE ADULT - PROVIDER SPECIALTY LIST ADULT
Plastic Surgery
Plastic Surgery
Surgery
Plastic Surgery

## 2017-02-16 NOTE — PATIENT PROFILE ADULT. - ABILITY TO HEAR (WITH HEARING AID OR HEARING APPLIANCE IF NORMALLY USED):
Adequate: hears normal conversation without difficulty
Adequate: hears normal conversation without difficulty

## 2017-02-21 DIAGNOSIS — K40.90 UNILATERAL INGUINAL HERNIA, WITHOUT OBSTRUCTION OR GANGRENE, NOT SPECIFIED AS RECURRENT: ICD-10-CM

## 2017-02-21 DIAGNOSIS — K43.2 INCISIONAL HERNIA WITHOUT OBSTRUCTION OR GANGRENE: ICD-10-CM

## 2017-02-21 DIAGNOSIS — K42.9 UMBILICAL HERNIA WITHOUT OBSTRUCTION OR GANGRENE: ICD-10-CM

## 2017-02-21 DIAGNOSIS — E66.9 OBESITY, UNSPECIFIED: ICD-10-CM

## 2017-02-21 DIAGNOSIS — R19.00 INTRA-ABDOMINAL AND PELVIC SWELLING, MASS AND LUMP, UNSPECIFIED SITE: ICD-10-CM

## 2017-02-21 DIAGNOSIS — L90.5 SCAR CONDITIONS AND FIBROSIS OF SKIN: ICD-10-CM

## 2017-02-21 DIAGNOSIS — E88.1 LIPODYSTROPHY, NOT ELSEWHERE CLASSIFIED: ICD-10-CM

## 2017-02-21 DIAGNOSIS — E78.5 HYPERLIPIDEMIA, UNSPECIFIED: ICD-10-CM

## 2019-05-27 NOTE — H&P ADULT. - ALLERGIC/IMMUNOLOGIC
Pt left AMA, signed papers. Educated patient on the importance of staying in the hospital until the physicians agree that she is medically stable to leave the hospital. Patient refused education, stating \"I don't care, I'm leaving. \" A friend is was at the bedside and assisted her off the unit. negative

## 2022-02-16 NOTE — PATIENT PROFILE ADULT. - PROVIDER NOTIFICATION
Declines Detail Level: Simple Additional Notes: Patient consent was obtained to proceed with the visit and recommended plan of care after discussion of all risks and benefits, including the risks of COVID-19 exposure.

## 2024-03-29 NOTE — PATIENT PROFILE ADULT. - AS SC BRADEN FRICTION
Diagnosis:   1. CVID (common variable immunodeficiency) (CMD)        Patient arrived for infusion today.     Dr Mccarty is the ordering clinician, therapy plan orders reviewed and signed by clinician.     Vital Signs:  ONC OP Encounter Vitals  BP: (!) 85/50  Heart Rate: 77  Temp: 97.3 °F (36.3 °C)  Temp src: Temporal  Weight: 84.8 kg (186 lb 15.2 oz)      Allergies:    ALLERGIES:   Allergen Reactions    Aspirin Other (See Comments)     asthma  asthma      Amoxicillin RASH     Took prior to dental procedure. One week later painful rash on abdomen and groin    Ampicillin Other (See Comments) and PRURITUS     unknown    Cefaclor Other (See Comments) and PRURITUS     unknown    Clarithromycin Other (See Comments) and PRURITUS     unknown    Doxycycline Other (See Comments)    Doxycycline Hyclate Other (See Comments)    Iodine   (Environmental Or Med) PRURITUS    Levofloxacin RASH    Nickel RASH     \"Questionable allergy to nickel in the past\". States would get \"rashes\" from cheap jewelry as a teenager.   \"Questionable allergy to nickel in the past\". States would get \"rashes\" from cheap jewelry as a teenager.       Shellfish-Derived Products   (Food Or Med) Other (See Comments)     unknown    Sulfa Antibiotics Other (See Comments)     unknown         Labs reviewed with the patient: N/A    Nursing Summary:  Port accessed. Premed of tylenol po given. IVIG infused in approximately one hour at a titrated rate    Patient condition stable during treatment? Yes  Questions were answered and understanding was verbalized. Yes   Education provided Previously given    Patient advised on follow up, any and all questions answered.  Patient Discharged to home Ambulatory with self  
(3) no apparent problem
(3) no apparent problem